# Patient Record
Sex: MALE | Employment: OTHER | URBAN - METROPOLITAN AREA
[De-identification: names, ages, dates, MRNs, and addresses within clinical notes are randomized per-mention and may not be internally consistent; named-entity substitution may affect disease eponyms.]

---

## 2017-06-19 ENCOUNTER — APPOINTMENT (OUTPATIENT)
Dept: GENERAL RADIOLOGY | Age: 67
End: 2017-06-19
Attending: EMERGENCY MEDICINE
Payer: MEDICARE

## 2017-06-19 ENCOUNTER — HOSPITAL ENCOUNTER (EMERGENCY)
Age: 67
Discharge: HOME | End: 2017-06-19
Attending: EMERGENCY MEDICINE
Payer: MEDICARE

## 2017-06-19 VITALS
OXYGEN SATURATION: 94 % | HEART RATE: 85 BPM | DIASTOLIC BLOOD PRESSURE: 82 MMHG | RESPIRATION RATE: 21 BRPM | HEIGHT: 69 IN | TEMPERATURE: 97.4 F | SYSTOLIC BLOOD PRESSURE: 123 MMHG | BODY MASS INDEX: 22.93 KG/M2 | WEIGHT: 154.8 LBS

## 2017-06-19 DIAGNOSIS — R06.02 SOB (SHORTNESS OF BREATH): Primary | ICD-10-CM

## 2017-06-19 LAB
ALBUMIN SERPL BCP-MCNC: 3.7 G/DL (ref 3.5–5)
ALBUMIN/GLOB SERPL: 1 {RATIO} (ref 1.1–2.2)
ALP SERPL-CCNC: 63 U/L (ref 45–117)
ALT SERPL-CCNC: 25 U/L (ref 12–78)
ANION GAP BLD CALC-SCNC: 8 MMOL/L (ref 5–15)
AST SERPL W P-5'-P-CCNC: 27 U/L (ref 15–37)
ATRIAL RATE: 77 BPM
BASOPHILS # BLD AUTO: 0 K/UL (ref 0–0.1)
BASOPHILS # BLD: 0 % (ref 0–1)
BILIRUB SERPL-MCNC: 1.1 MG/DL (ref 0.2–1)
BUN SERPL-MCNC: 36 MG/DL (ref 6–20)
BUN/CREAT SERPL: 23 (ref 12–20)
CALCIUM SERPL-MCNC: 9.3 MG/DL (ref 8.5–10.1)
CALCULATED P AXIS, ECG09: 14 DEGREES
CALCULATED R AXIS, ECG10: 169 DEGREES
CALCULATED T AXIS, ECG11: 36 DEGREES
CHLORIDE SERPL-SCNC: 105 MMOL/L (ref 97–108)
CO2 SERPL-SCNC: 25 MMOL/L (ref 21–32)
CREAT SERPL-MCNC: 1.55 MG/DL (ref 0.7–1.3)
DIAGNOSIS, 93000: NORMAL
EOSINOPHIL # BLD: 0.2 K/UL (ref 0–0.4)
EOSINOPHIL NFR BLD: 2 % (ref 0–7)
ERYTHROCYTE [DISTWIDTH] IN BLOOD BY AUTOMATED COUNT: 15.9 % (ref 11.5–14.5)
GLOBULIN SER CALC-MCNC: 3.7 G/DL (ref 2–4)
GLUCOSE SERPL-MCNC: 91 MG/DL (ref 65–100)
HCT VFR BLD AUTO: 45.9 % (ref 36.6–50.3)
HGB BLD-MCNC: 14.6 G/DL (ref 12.1–17)
LYMPHOCYTES # BLD AUTO: 19 % (ref 12–49)
LYMPHOCYTES # BLD: 1.5 K/UL (ref 0.8–3.5)
MCH RBC QN AUTO: 27.9 PG (ref 26–34)
MCHC RBC AUTO-ENTMCNC: 31.8 G/DL (ref 30–36.5)
MCV RBC AUTO: 87.6 FL (ref 80–99)
MONOCYTES # BLD: 0.9 K/UL (ref 0–1)
MONOCYTES NFR BLD AUTO: 11 % (ref 5–13)
NEUTS SEG # BLD: 5.2 K/UL (ref 1.8–8)
NEUTS SEG NFR BLD AUTO: 68 % (ref 32–75)
P-R INTERVAL, ECG05: 150 MS
PLATELET # BLD AUTO: 131 K/UL (ref 150–400)
POTASSIUM SERPL-SCNC: 4.9 MMOL/L (ref 3.5–5.1)
PROT SERPL-MCNC: 7.4 G/DL (ref 6.4–8.2)
Q-T INTERVAL, ECG07: 440 MS
QRS DURATION, ECG06: 140 MS
QTC CALCULATION (BEZET), ECG08: 497 MS
RBC # BLD AUTO: 5.24 M/UL (ref 4.1–5.7)
SODIUM SERPL-SCNC: 138 MMOL/L (ref 136–145)
VENTRICULAR RATE, ECG03: 77 BPM
WBC # BLD AUTO: 7.7 K/UL (ref 4.1–11.1)

## 2017-06-19 PROCEDURE — 80053 COMPREHEN METABOLIC PANEL: CPT | Performed by: EMERGENCY MEDICINE

## 2017-06-19 PROCEDURE — 93005 ELECTROCARDIOGRAM TRACING: CPT

## 2017-06-19 PROCEDURE — 36415 COLL VENOUS BLD VENIPUNCTURE: CPT | Performed by: EMERGENCY MEDICINE

## 2017-06-19 PROCEDURE — 85025 COMPLETE CBC W/AUTO DIFF WBC: CPT | Performed by: EMERGENCY MEDICINE

## 2017-06-19 PROCEDURE — 71020 XR CHEST PA LAT: CPT

## 2017-06-19 PROCEDURE — 99285 EMERGENCY DEPT VISIT HI MDM: CPT

## 2017-06-19 NOTE — ED NOTES
Discharge instructions reviewed with the pt by MD.  Pt ambulatory from the department without any worsening shortness of breath.

## 2017-06-19 NOTE — CONSULTS
Consult    Patient: Harjinder Velasquez MRN: [id number]  SSN: [id number]    YOB: 1950  Age: 78 y.o. Sex: male       Subjective:      Date of  Admission: 6/19/2017     Admission type: Emergency     Reason for consult: FRANCK Velasquez is a 78 y.o. male admitted for shortness of breath. Mr Evangelist Chandra had been a long-term patient of Dr Shiv Kilgore prior to his detention and has been followed for CAD and CHF. He has had a known cardiomyopathy for quite some time and had essentially no symptoms for quite a while. When he a few months ago he had mentioned he was starting to get some exertional dyspnea on mild-moderate activity, so I had referred him to see Dr Boubacar Mejia for possible advanced CHF therapies. He was started on Entresto. He had a 6min walk test done by Dr Boubacar Mejia and only managed 148m, which is associated with worse outcomes so we have started the discussions about possible LVAD work-up, though he has not seen anyone specifically about this yet. Over the past several weeks he has noticed more exertional dyspnea to the point where he is short of breath on relatively mild activity and decided to come in to the ER for evaluation. No chest pain. Primary Care Provider: Steve Beth MD  Past Medical History:   Diagnosis Date    CAD (coronary artery disease)     [facility]     HIT (heparin-induced thrombocytopenia) ([facility]     Hyperlipidemia       Past Surgical History:   Procedure Laterality Date    CARDIAC SURG PROCEDURE UNLIST      CABG 2006     History reviewed. No pertinent family history. Social History   Substance Use Topics    Smoking status: Former Smoker    Smokeless tobacco: Not on file    Alcohol use No      No current facility-administered medications for this encounter. Current Outpatient Prescriptions   Medication Sig    digoxin (LANOXIN) 0.125 mg tablet Take 0.125 mg by mouth. Mon.,Wed. And Fri.     aspirin delayed-release 81 mg tablet Take 81 mg by mouth daily.  furosemide (LASIX) 40 mg tablet Take 40 mg by mouth two (2) times a day.  multivitamin (ONE A DAY) tablet Take 1 tablet by mouth daily.  ascorbic acid (VITAMIN C) 250 mg tablet Take 250 mg by mouth.  busPIRone (BUSPAR) 15 mg tablet Take 15 mg by mouth three (3) times daily.  carvedilol (COREG) 3.125 mg tablet Take 3.125 mg by mouth two (2) times daily (with meals).  lisinopril (PRINIVIL, ZESTRIL) 2.5 mg tablet Take 2.5 mg by mouth nightly.  warfarin (COUMADIN) 2.5 mg tablet Take 2.5 mg by mouth daily. 2.5 mg Mon.,Wed.,Fri. And 1.25 on Tues. .Thurs.,Sat. And Sun.  flurazepam (DALMANE) 15 mg capsule Take 15 mg by mouth nightly.  acetaminophen (TYLENOL EXTRA STRENGTH) 500 mg tablet Take 1,000 mg by mouth nightly.  allopurinol (ZYLOPRIM) 100 mg tablet Take 200 mg by mouth daily.  omeprazole (PRILOSEC) 20 mg capsule Take 20 mg by mouth daily.  CEPHALEXIN (KEFLEX PO) Take  by mouth two (2) times a day. Dose unknown. Allergies   Allergen Reactions    Atorvastatin Myalgia    Ezetimibe Myalgia    Heparin Analogues Anaphylaxis    Naprosyn [Naproxen] Hives and Swelling    Pcn [Penicillins] Hives     No reactions with Keflex.  Porcine Skin Anaphylaxis     R/t heparin    Pravastatin Myalgia    Rosuvastatin Myalgia    Simvastatin Myalgia    Morphine Rash    Clindamycin Rash        Review of Systems:  A comprehensive review of systems was negative except for that written in the History of Present Illness. Subjective:     Physical Exam:  Visit Vitals    /81    Pulse 80    Temp 97.4 °F (36.3 °C)    Resp 21    Ht 5' 8.5\" (1.74 m)    Wt 70.2 kg (154 lb 12.8 oz)    SpO2 95%    BMI 23.19 kg/m2     General Appearance:  Well developed, well nourished,alert and oriented x 3, and individual in no acute distress. Ears/Nose/Mouth/Throat:   Hearing grossly normal.         Neck: Supple. Chest:   Lungs clear to auscultation bilaterally.    Cardiovascular: Regular rate and rhythm, S1, S2 normal, no murmur. Abdomen:   Soft, non-tender, bowel sounds are active. Extremities: No edema bilaterally. Skin: Warm and dry.                Cardiographics:  Telemetry: paced rhythm  ECG:paced rhythm    Echocardiogram:   5/2/17  EF 10-15%, moderately dilated LV  Mild AI, mild MR, mild TR  Estimated PASP of 77mmHg, dilated IVC    Data Reviewed:   BMP:   Lab Results   Component Value Date/Time     06/19/2017 02:45 PM    K 4.9 06/19/2017 02:45 PM     06/19/2017 02:45 PM    CO2 25 06/19/2017 02:45 PM    AGAP 8 06/19/2017 02:45 PM    GLU 91 06/19/2017 02:45 PM    BUN 36 (H) 06/19/2017 02:45 PM    CREA 1.55 (H) 06/19/2017 02:45 PM    GFRAA 54 (L) 06/19/2017 02:45 PM    GFRNA 45 (L) 06/19/2017 02:45 PM     CBC:   Lab Results   Component Value Date/Time    WBC 7.7 06/19/2017 02:45 PM    HGB 14.6 06/19/2017 02:45 PM    HCT 45.9 06/19/2017 02:45 PM     (L) 06/19/2017 02:45 PM     All Cardiac Markers in the last 24 hours: No results found for: CPK, CKMMB, CKMB, RCK3, CKMBT, CKNDX, CKND1, FREDI, TROPT, TROIQ, MAIKOL, TROPT, TNIPOC, BNP, BNPP     Assessment:       1) Acute on chronic systolic heart failure  NYHA class III  Warm and well perfused  Not in respiratory distress / pulmonary edema CXR  Suspect he has quite high LV filling pressures and his PA pressure was high on a recent echo  This may be more of a low CO and high pressure situation than overt volume overload    2) Ischemic cardiomyopathy    3) CAD    4) Prior MI    5) HIT    6) CKD stage III     Plan:     Suggest increase in diuretics: to take lasix 80mg AM and 40mg PM  Recommend f/u with me in a few days - latest early next week  Advised him to return to ER or call if symptoms worsening and becoming dyspneic at rest  I think he is hemodynamically stable enough to go home and to try more intensive diuretic regimen    Ultimately the largest question is to figure out his candidacy for advanced CHF therapies such as LVAD/transplant  We can continue to work on this as OP    Signed By: George Forte MD     June 19, 2017

## 2017-06-19 NOTE — ED TRIAGE NOTES
TRIAGE NOTE: \"I am having shortness of breath. It's been like that getting worse over the past month. Dr. Jigar Vargas is my cardiologist and they have suggested that I need a LVAD. \"

## 2017-06-19 NOTE — DISCHARGE INSTRUCTIONS
Shortness of Breath: Care Instructions  Your Care Instructions  Shortness of breath has many causes. Sometimes conditions such as anxiety can lead to shortness of breath. Some people get mild shortness of breath when they exercise. Trouble breathing also can be a symptom of a serious problem, such as asthma, lung disease, emphysema, heart problems, and pneumonia. If your shortness of breath continues, you may need tests and treatment. Watch for any changes in your breathing and other symptoms. Follow-up care is a key part of your treatment and safety. Be sure to make and go to all appointments, and call your doctor if you are having problems. Its also a good idea to know your test results and keep a list of the medicines you take. How can you care for yourself at home? · Do not smoke or allow others to smoke around you. If you need help quitting, talk to your doctor about stop-smoking programs and medicines. These can increase your chances of quitting for good. · Get plenty of rest and sleep. · Take your medicines exactly as prescribed. Call your doctor if you think you are having a problem with your medicine. · Find healthy ways to deal with stress. ¨ Exercise daily. ¨ Get plenty of sleep. ¨ Eat regularly and well. When should you call for help? Call 911 anytime you think you may need emergency care. For example, call if:  · You have severe shortness of breath. · You have symptoms of a heart attack. These may include:  ¨ Chest pain or pressure, or a strange feeling in the chest.  ¨ Sweating. ¨ Shortness of breath. ¨ Nausea or vomiting. ¨ Pain, pressure, or a strange feeling in the back, neck, jaw, or upper belly or in one or both shoulders or arms. ¨ Lightheadedness or sudden weakness. ¨ A fast or irregular heartbeat. After you call 911, the  may tell you to chew 1 adult-strength or 2 to 4 low-dose aspirin. Wait for an ambulance. Do not try to drive yourself.   Call your doctor now or seek immediate medical care if:  · Your shortness of breath gets worse or you start to wheeze. Wheezing is a high-pitched sound when you breathe. · You wake up at night out of breath or have to prop your head up on several pillows to breathe. · You are short of breath after only light activity or while at rest.  Watch closely for changes in your health, and be sure to contact your doctor if:  · You do not get better over the next 1 to 2 days. Where can you learn more? Go to http://laureano-brenda.info/. Enter S780 in the search box to learn more about \"Shortness of Breath: Care Instructions. \"  Current as of: March 25, 2017  Content Version: 11.3  © 9109-5031 [employer]. Care instructions adapted under license by [employer] (which disclaims liability or warranty for this information). If you have questions about a medical condition or this instruction, always ask your healthcare professional. [place] [address] any warranty or liability for your use of this information. We hope that we have addressed all of your medical concerns. The examination and treatment you received in the Emergency Department were for an emergent problem and were not intended as complete care. It is important that you follow up with your healthcare provider(s) for ongoing care. If your symptoms worsen or do not improve as expected, and you are unable to reach your usual health care provider(s), you should return to the Emergency Department. Today's healthcare is undergoing tremendous change, and patient satisfaction surveys are one of the many tools to assess the quality of medical care. You may receive a survey from the [employer] organization regarding your experience in the Emergency Department. I hope that your experience has been completely positive, particularly the medical care that I provided.   As such, please participate in the survey; anything less than excellent does not meet my expectations or intentions. [facility] and [address] [place] [address] participate in nationally recognized quality of care measures. If your blood pressure is greater than 120/80, as reported below, we urge that you seek medical care to address the potential of high blood pressure, commonly known as hypertension. Hypertension can be hereditary or can be caused by certain medical conditions, pain, stress, or \"white coat syndrome. \"       Please make an appointment with your health care provider(s) for follow up of your Emergency Department visit. VITALS:   Patient Vitals for the past 8 hrs:   Temp Pulse Resp BP SpO2   06/19/17 1730 - 85 21 123/82 94 %   06/19/17 1615 - 80 21 109/81 95 %   06/19/17 1600 - 79 23 111/83 93 %   06/19/17 1530 - 91 14 121/89 98 %   06/19/17 1500 - 75 18 114/75 99 %   06/19/17 1429 97.4 °F (36.3 °C) 81 16 97/69 94 %          Thank you for allowing us to provide you with medical care today. We realize that you have many choices for your emergency care needs. Please choose us in the future for any continued health care needs. Estefani Rodriguez MD    [address] [facility].   Office: [phone]            Recent Results (from the past 24 hour(s))   CBC WITH AUTOMATED DIFF    Collection Time: 06/19/17  2:45 PM   Result Value Ref Range    WBC 7.7 4.1 - 11.1 K/uL    RBC 5.24 4.10 - 5.70 M/uL    HGB 14.6 12.1 - 17.0 g/dL    HCT 45.9 36.6 - 50.3 %    MCV 87.6 80.0 - 99.0 FL    MCH 27.9 26.0 - 34.0 PG    MCHC 31.8 30.0 - 36.5 g/dL    RDW 15.9 (H) 11.5 - 14.5 %    PLATELET 368 (L) 367 - 400 K/uL    NEUTROPHILS 68 32 - 75 %    LYMPHOCYTES 19 12 - 49 %    MONOCYTES 11 5 - 13 %    EOSINOPHILS 2 0 - 7 %    BASOPHILS 0 0 - 1 %    ABS. NEUTROPHILS 5.2 1.8 - 8.0 K/UL    ABS. LYMPHOCYTES 1.5 0.8 - 3.5 K/UL    ABS. MONOCYTES 0.9 0.0 - 1.0 K/UL    ABS. EOSINOPHILS 0.2 0.0 - 0.4 K/UL    ABS.  BASOPHILS 0.0 0.0 - 0.1 K/UL   METABOLIC PANEL, COMPREHENSIVE    Collection Time: 06/19/17  2:45 PM   Result Value Ref Range    Sodium 138 136 - 145 mmol/L    Potassium 4.9 3.5 - 5.1 mmol/L    Chloride 105 97 - 108 mmol/L    CO2 25 21 - 32 mmol/L    Anion gap 8 5 - 15 mmol/L    Glucose 91 65 - 100 mg/dL    BUN 36 (H) 6 - 20 MG/DL    Creatinine 1.55 (H) 0.70 - 1.30 MG/DL    BUN/Creatinine ratio 23 (H) 12 - 20      GFR est AA 54 (L) >60 ml/min/1.73m2    GFR est non-AA 45 (L) >60 ml/min/1.73m2    Calcium 9.3 8.5 - 10.1 MG/DL    Bilirubin, total 1.1 (H) 0.2 - 1.0 MG/DL    ALT (SGPT) 25 12 - 78 U/L    AST (SGOT) 27 15 - 37 U/L    Alk. phosphatase 63 45 - 117 U/L    Protein, total 7.4 6.4 - 8.2 g/dL    Albumin 3.7 3.5 - 5.0 g/dL    Globulin 3.7 2.0 - 4.0 g/dL    A-G Ratio 1.0 (L) 1.1 - 2.2     EKG, 12 LEAD, INITIAL    Collection Time: 06/19/17  2:48 PM   Result Value Ref Range    Ventricular Rate 77 BPM    Atrial Rate 77 BPM    P-R Interval 150 ms    QRS Duration 140 ms    Q-T Interval 440 ms    QTC Calculation (Bezet) 497 ms    Calculated P Axis 14 degrees    Calculated R Axis 169 degrees    Calculated T Axis 36 degrees    Diagnosis       Atrial-sensed ventricular-paced rhythm  Biventricular pacemaker detected  When compared with ECG of 17-NOV-2014 20:35,  Electronic ventricular pacemaker has replaced Sinus rhythm  Confirmed by Patsy Montiel MD, Marquez Coma ([id number]) on 6/19/2017 4:30:43 PM         Xr Chest Pa Lat    Result Date: 6/19/2017  Exam:  2 view chest Indication: Shortness of breath Comparison to 11/17/2014. PA and lateral views demonstrate stable heart size at the upper limits of normal. The patient is status post median sternotomy. A pacemaker has been placed in the interval and the leads project in satisfactory position. Chronic pleural thickening at the right lung base and right apex remains unchanged. Pleural parenchymal scarring in the right lower lobe is stable. There is no acute process in the lung fields.  The osseous structures are unremarkable.      Impression: No acute process or change in the lung fields compared to the prior exam.

## 2017-06-19 NOTE — ED PROVIDER NOTES
HPI Comments: 78 y.o. male with past medical history significant for CAD, HIT, heart failure, hyperlipidemia, CABG who presents accompanied by spouse with chief complaint of SOB. Patient states he had onset of SOB ~3-4 weeks ago and notes \"it is especially bad in the mornings with laying flat\". Patient reports being seen by Dr. Indio Bal for this \"a few weeks ago\" where he was referred to see Dr. Dorothy Gutierrez. Patient was then seen by Dr. Dorothy Gutierrez where he had echocardiogram done showing \"EF of 10-12\" and was told he needed to consider a heart replacement or LVAD placement. Patient notes Dr. Dorothy Gutierrez also changed his BP medication from Lisinopril to [facility]. He reports taking it for the past \"few weeks\". Patient notes SOB has gotten progressively worse since onset and reports associated LIN. He explains that he gets \"out of breath\" with any physical activity and notes he is SOB with talking to physician in room. Patient explains that he recently spoke to a friend who is a physician that suggested he get a second opinion on the LVAD at [facility]. He is unsure of increased SOB means that he should get LVAD placed \"sometime soon\". Spouse reports patient hx of \"massive MI\" for which he was hospitalized and had CABG in [address] [place] [address]. Spouse notes patient was then placed in drug induced coma for ~1 month because \"his heart was not strong enough\" and explains that he developed heparin induced thrombocytopenia. Patient reports associated partial amputation of b/l feet as well as 4th digit on right hand. Patient denies any chest pain, nausea, vomiting, fever, chills, congestion, urinary problems. There are no other acute medical concerns at this time. Social hx: former smoker. Denies ETOH use. PCP: Dalila Najera DO    Cardiology: Arpita Heard MD    Note written by Elsie High. Kelsey Hill, as dictated by Dougie Norris MD 3:34 PM      The history is provided by the patient and the spouse. No  was used.         Past Medical History:   Diagnosis Date    CAD (coronary artery disease)     Heart failure ([address]     HIT (heparin-induced thrombocytopenia) ([facility])     Hyperlipidemia        Past Surgical History:   Procedure Laterality Date    CARDIAC SURG PROCEDURE UNLIST      CABG 2006         History reviewed. No pertinent family history. Social History     Social History    Marital status:      Spouse name: N/A    Number of children: N/A    Years of education: N/A     Occupational History    Not on file. Social History Main Topics    Smoking status: Former Smoker    Smokeless tobacco: Not on file    Alcohol use No    Drug use: No    Sexual activity: Not on file     Other Topics Concern    Not on file     Social History Narrative         ALLERGIES: Atorvastatin; Ezetimibe; Heparin analogues; Naprosyn [naproxen]; Pcn [penicillins]; Porcine skin; Pravastatin; Rosuvastatin; Simvastatin; Morphine; and Clindamycin    Review of Systems   Constitutional: Negative for appetite change, chills and fever. HENT: Negative for congestion, rhinorrhea, sore throat and trouble swallowing. Eyes: Negative for photophobia. Respiratory: Positive for shortness of breath. Negative for cough. Cardiovascular: Negative for chest pain and palpitations. Gastrointestinal: Negative for abdominal pain, nausea and vomiting. Genitourinary: Negative for difficulty urinating, dysuria, frequency and hematuria. Musculoskeletal: Negative for arthralgias. Neurological: Negative for dizziness, syncope and weakness. Psychiatric/Behavioral: Negative for behavioral problems. The patient is not nervous/anxious. All other systems reviewed and are negative.       Vitals:    06/19/17 1500 06/19/17 1530 06/19/17 1600 06/19/17 1615   BP: 114/75 121/89 111/83 109/81   Pulse: 75 91 79 80   Resp: 18 14 23 21   Temp:       SpO2: 99% 98% 93% 95%   Weight:       Height:                Physical Exam   Constitutional: He appears well-developed and well-nourished. HENT:   Head: Normocephalic and atraumatic. Mouth/Throat: Oropharynx is clear and moist.   Eyes: EOM are normal. Pupils are equal, round, and reactive to light. Neck: Normal range of motion. Neck supple. Well healed trach scar. Cardiovascular: Normal rate, regular rhythm, normal heart sounds and intact distal pulses. Exam reveals no gallop and no friction rub. No murmur heard. Pulmonary/Chest: Effort normal. No respiratory distress. He has no wheezes. He has no rales. Pacemaker in left upper anterior chest wall. Abdominal: Soft. There is no tenderness. There is no rebound. Musculoskeletal: Normal range of motion. He exhibits no tenderness. B/l transmetatarsal amputations of feet. Amputation of 4th digits on right hand. Neurological: He is alert. No cranial nerve deficit. Motor; symmetric   Skin: No erythema. Sternotomy scar. Psychiatric: He has a normal mood and affect. His behavior is normal.   Nursing note and vitals reviewed. Note written by Quyen Lincoln. Miriam Church, as dictated by Rosemarie Kaminski MD 3:35 PM         [facility]  ED Course       Procedures  ED EKG interpretation:  Rhythm: paced; and regular . Rate (approx.): 75; Axis: normal; P wave: normal; QRS interval: prolonged; ST/T wave: non-specific changes; in  Lead: ; Other findings: . This EKG was interpreted by Rosemarie Kaminski MD,ED Provider.  5:36 PM